# Patient Record
Sex: FEMALE | Race: WHITE | NOT HISPANIC OR LATINO
[De-identification: names, ages, dates, MRNs, and addresses within clinical notes are randomized per-mention and may not be internally consistent; named-entity substitution may affect disease eponyms.]

---

## 2015-07-13 RX ORDER — HYDRALAZINE HCL 50 MG
100 TABLET ORAL
Qty: 0 | Refills: 0 | COMMUNITY
Start: 2015-07-13

## 2017-02-01 ENCOUNTER — MEDICATION RENEWAL (OUTPATIENT)
Age: 82
End: 2017-02-01

## 2017-03-06 ENCOUNTER — APPOINTMENT (OUTPATIENT)
Dept: SURGICAL ONCOLOGY | Facility: CLINIC | Age: 82
End: 2017-03-06

## 2017-03-06 VITALS
RESPIRATION RATE: 15 BRPM | TEMPERATURE: 98.6 F | WEIGHT: 133 LBS | HEART RATE: 61 BPM | SYSTOLIC BLOOD PRESSURE: 162 MMHG | HEIGHT: 59 IN | BODY MASS INDEX: 26.81 KG/M2 | DIASTOLIC BLOOD PRESSURE: 68 MMHG

## 2017-03-06 DIAGNOSIS — Z85.3 ENCOUNTER FOR FOLLOW-UP EXAMINATION AFTER COMPLETED TREATMENT FOR MALIGNANT NEOPLASM: ICD-10-CM

## 2017-03-06 DIAGNOSIS — Z97.3 PRESENCE OF SPECTACLES AND CONTACT LENSES: ICD-10-CM

## 2017-03-06 DIAGNOSIS — Z08 ENCOUNTER FOR FOLLOW-UP EXAMINATION AFTER COMPLETED TREATMENT FOR MALIGNANT NEOPLASM: ICD-10-CM

## 2017-03-06 DIAGNOSIS — Z80.9 FAMILY HISTORY OF MALIGNANT NEOPLASM, UNSPECIFIED: ICD-10-CM

## 2017-03-06 RX ORDER — AFLIBERCEPT 40 MG/ML
2 INJECTION, SOLUTION INTRAVITREAL
Refills: 0 | Status: ACTIVE | COMMUNITY

## 2017-03-27 ENCOUNTER — INPATIENT (INPATIENT)
Facility: HOSPITAL | Age: 82
LOS: 1 days | Discharge: HOME | DRG: 684 | End: 2017-03-29
Attending: INTERNAL MEDICINE | Admitting: INTERNAL MEDICINE
Payer: COMMERCIAL

## 2017-03-27 VITALS
WEIGHT: 130.07 LBS | OXYGEN SATURATION: 98 % | HEIGHT: 59 IN | DIASTOLIC BLOOD PRESSURE: 80 MMHG | HEART RATE: 81 BPM | TEMPERATURE: 98 F | RESPIRATION RATE: 18 BRPM | SYSTOLIC BLOOD PRESSURE: 182 MMHG

## 2017-03-27 DIAGNOSIS — R55 SYNCOPE AND COLLAPSE: ICD-10-CM

## 2017-03-27 LAB
ALBUMIN SERPL ELPH-MCNC: 3.6 G/DL — SIGNIFICANT CHANGE UP (ref 3.3–5)
ALBUMIN SERPL ELPH-MCNC: 3.9 G/DL
ALP BLD-CCNC: 49 U/L
ALP SERPL-CCNC: 55 U/L — SIGNIFICANT CHANGE UP (ref 40–120)
ALT FLD-CCNC: 18 U/L — SIGNIFICANT CHANGE UP (ref 12–78)
ALT SERPL-CCNC: 11 U/L
ANION GAP SERPL CALC-SCNC: 11 MMOL/L — SIGNIFICANT CHANGE UP (ref 5–17)
APPEARANCE UR: CLEAR — SIGNIFICANT CHANGE UP
APTT BLD: 33.7 SEC — SIGNIFICANT CHANGE UP (ref 27.5–37.4)
AST SERPL-CCNC: 18 U/L — SIGNIFICANT CHANGE UP (ref 15–37)
AST SERPL-CCNC: 21 U/L
BASOPHILS # BLD AUTO: 0 K/UL — SIGNIFICANT CHANGE UP (ref 0–0.2)
BASOPHILS # BLD AUTO: 0.02 K/UL
BASOPHILS NFR BLD AUTO: 0.3 %
BASOPHILS NFR BLD AUTO: 0.6 % — SIGNIFICANT CHANGE UP (ref 0–2)
BILIRUB DIRECT SERPL-MCNC: 0.2 MG/DL
BILIRUB INDIRECT SERPL-MCNC: 0.3 MG/DL
BILIRUB SERPL-MCNC: 0.5 MG/DL
BILIRUB SERPL-MCNC: 0.5 MG/DL — SIGNIFICANT CHANGE UP (ref 0.2–1.2)
BILIRUB UR-MCNC: NEGATIVE — SIGNIFICANT CHANGE UP
BUN SERPL-MCNC: 22 MG/DL — SIGNIFICANT CHANGE UP (ref 7–23)
CALCIUM SERPL-MCNC: 9.4 MG/DL — SIGNIFICANT CHANGE UP (ref 8.5–10.1)
CANCER AG27-29 SERPL-ACNC: 18.6 U/ML
CEA SERPL-MCNC: 2.6 NG/ML
CHLORIDE SERPL-SCNC: 109 MMOL/L — HIGH (ref 96–108)
CK MB BLD-MCNC: 4 % — HIGH (ref 0–3.5)
CK MB CFR SERPL CALC: 2.1 NG/ML — SIGNIFICANT CHANGE UP (ref 0–3.6)
CK SERPL-CCNC: 53 U/L — SIGNIFICANT CHANGE UP (ref 26–192)
CO2 SERPL-SCNC: 23 MMOL/L — SIGNIFICANT CHANGE UP (ref 22–31)
COLOR SPEC: YELLOW — SIGNIFICANT CHANGE UP
CREAT SERPL-MCNC: 0.88 MG/DL — SIGNIFICANT CHANGE UP (ref 0.5–1.3)
DIFF PNL FLD: NEGATIVE — SIGNIFICANT CHANGE UP
EOSINOPHIL # BLD AUTO: 0.08 K/UL
EOSINOPHIL # BLD AUTO: 0.1 K/UL — SIGNIFICANT CHANGE UP (ref 0–0.5)
EOSINOPHIL NFR BLD AUTO: 1.1 %
EOSINOPHIL NFR BLD AUTO: 1.7 % — SIGNIFICANT CHANGE UP (ref 0–6)
GLUCOSE SERPL-MCNC: 112 MG/DL — HIGH (ref 70–99)
GLUCOSE UR QL: NEGATIVE — SIGNIFICANT CHANGE UP
HCT VFR BLD CALC: 34.8 %
HCT VFR BLD CALC: 36.8 % — SIGNIFICANT CHANGE UP (ref 34.5–45)
HGB BLD-MCNC: 10.7 G/DL
HGB BLD-MCNC: 11.7 G/DL — SIGNIFICANT CHANGE UP (ref 11.5–15.5)
IMM GRANULOCYTES NFR BLD AUTO: 0.1 %
INR BLD: 0.96 RATIO — SIGNIFICANT CHANGE UP (ref 0.88–1.16)
KETONES UR-MCNC: NEGATIVE — SIGNIFICANT CHANGE UP
LEUKOCYTE ESTERASE UR-ACNC: ABNORMAL
LYMPHOCYTES # BLD AUTO: 1.5 K/UL — SIGNIFICANT CHANGE UP (ref 1–3.3)
LYMPHOCYTES # BLD AUTO: 1.7 K/UL
LYMPHOCYTES # BLD AUTO: 23.6 % — SIGNIFICANT CHANGE UP (ref 13–44)
LYMPHOCYTES NFR BLD AUTO: 22.7 %
MAN DIFF?: NORMAL
MCHC RBC-ENTMCNC: 30.7 GM/DL
MCHC RBC-ENTMCNC: 31.2 PG
MCHC RBC-ENTMCNC: 31.6 PG — SIGNIFICANT CHANGE UP (ref 27–34)
MCHC RBC-ENTMCNC: 31.9 GM/DL — LOW (ref 32–36)
MCV RBC AUTO: 101.5 FL
MCV RBC AUTO: 99.2 FL — SIGNIFICANT CHANGE UP (ref 80–100)
MONOCYTES # BLD AUTO: 0.7 K/UL — SIGNIFICANT CHANGE UP (ref 0–0.9)
MONOCYTES # BLD AUTO: 0.83 K/UL
MONOCYTES NFR BLD AUTO: 11.1 %
MONOCYTES NFR BLD AUTO: 12 % — HIGH (ref 1–9)
NEUTROPHILS # BLD AUTO: 3.8 K/UL — SIGNIFICANT CHANGE UP (ref 1.8–7.4)
NEUTROPHILS # BLD AUTO: 4.84 K/UL
NEUTROPHILS NFR BLD AUTO: 62.2 % — SIGNIFICANT CHANGE UP (ref 43–77)
NEUTROPHILS NFR BLD AUTO: 64.7 %
NITRITE UR-MCNC: NEGATIVE — SIGNIFICANT CHANGE UP
PH UR: 5 — SIGNIFICANT CHANGE UP (ref 4.8–8)
PLATELET # BLD AUTO: 197 K/UL — SIGNIFICANT CHANGE UP (ref 150–400)
PLATELET # BLD AUTO: 228 K/UL
POTASSIUM SERPL-MCNC: 3.8 MMOL/L — SIGNIFICANT CHANGE UP (ref 3.5–5.3)
POTASSIUM SERPL-SCNC: 3.8 MMOL/L — SIGNIFICANT CHANGE UP (ref 3.5–5.3)
PROT SERPL-MCNC: 6.1 G/DL
PROT SERPL-MCNC: 6.8 G/DL — SIGNIFICANT CHANGE UP (ref 6–8.3)
PROT UR-MCNC: NEGATIVE — SIGNIFICANT CHANGE UP
PROTHROM AB SERPL-ACNC: 10.5 SEC — SIGNIFICANT CHANGE UP (ref 9.8–12.7)
RBC # BLD: 3.43 M/UL
RBC # BLD: 3.71 M/UL — LOW (ref 3.8–5.2)
RBC # FLD: 13.5 % — SIGNIFICANT CHANGE UP (ref 10.3–14.5)
RBC # FLD: 14.9 %
SODIUM SERPL-SCNC: 143 MMOL/L — SIGNIFICANT CHANGE UP (ref 135–145)
SP GR SPEC: 1 — LOW (ref 1.01–1.02)
TROPONIN I SERPL-MCNC: <.015 NG/ML — SIGNIFICANT CHANGE UP (ref 0.01–0.04)
UROBILINOGEN FLD QL: NEGATIVE — SIGNIFICANT CHANGE UP
WBC # BLD: 6.2 K/UL — SIGNIFICANT CHANGE UP (ref 3.8–10.5)
WBC # FLD AUTO: 6.2 K/UL — SIGNIFICANT CHANGE UP (ref 3.8–10.5)
WBC # FLD AUTO: 7.48 K/UL

## 2017-03-27 PROCEDURE — 71010: CPT | Mod: 26

## 2017-03-27 PROCEDURE — 70450 CT HEAD/BRAIN W/O DYE: CPT | Mod: 26

## 2017-03-27 PROCEDURE — 93010 ELECTROCARDIOGRAM REPORT: CPT

## 2017-03-27 PROCEDURE — 99285 EMERGENCY DEPT VISIT HI MDM: CPT

## 2017-03-27 RX ORDER — HYDRALAZINE HCL 50 MG
100 TABLET ORAL THREE TIMES A DAY
Qty: 0 | Refills: 0 | Status: DISCONTINUED | OUTPATIENT
Start: 2017-03-27 | End: 2017-03-28

## 2017-03-27 RX ORDER — ASPIRIN/CALCIUM CARB/MAGNESIUM 324 MG
81 TABLET ORAL DAILY
Qty: 0 | Refills: 0 | Status: DISCONTINUED | OUTPATIENT
Start: 2017-03-27 | End: 2017-03-29

## 2017-03-27 RX ORDER — ALLOPURINOL 300 MG
100 TABLET ORAL DAILY
Qty: 0 | Refills: 0 | Status: DISCONTINUED | OUTPATIENT
Start: 2017-03-27 | End: 2017-03-29

## 2017-03-27 RX ORDER — ACETAMINOPHEN 500 MG
650 TABLET ORAL EVERY 6 HOURS
Qty: 0 | Refills: 0 | Status: DISCONTINUED | OUTPATIENT
Start: 2017-03-27 | End: 2017-03-29

## 2017-03-27 RX ORDER — SODIUM CHLORIDE 9 MG/ML
1000 INJECTION INTRAMUSCULAR; INTRAVENOUS; SUBCUTANEOUS ONCE
Qty: 0 | Refills: 0 | Status: COMPLETED | OUTPATIENT
Start: 2017-03-27 | End: 2017-03-27

## 2017-03-27 RX ORDER — ENOXAPARIN SODIUM 100 MG/ML
40 INJECTION SUBCUTANEOUS DAILY
Qty: 0 | Refills: 0 | Status: DISCONTINUED | OUTPATIENT
Start: 2017-03-27 | End: 2017-03-29

## 2017-03-27 RX ORDER — ANASTROZOLE 1 MG/1
1 TABLET ORAL DAILY
Qty: 0 | Refills: 0 | Status: DISCONTINUED | OUTPATIENT
Start: 2017-03-27 | End: 2017-03-29

## 2017-03-27 RX ORDER — DOCUSATE SODIUM 100 MG
100 CAPSULE ORAL THREE TIMES A DAY
Qty: 0 | Refills: 0 | Status: DISCONTINUED | OUTPATIENT
Start: 2017-03-27 | End: 2017-03-29

## 2017-03-27 RX ORDER — CARVEDILOL PHOSPHATE 80 MG/1
3.12 CAPSULE, EXTENDED RELEASE ORAL EVERY 12 HOURS
Qty: 0 | Refills: 0 | Status: DISCONTINUED | OUTPATIENT
Start: 2017-03-27 | End: 2017-03-29

## 2017-03-27 RX ORDER — HYDRALAZINE HCL 50 MG
10 TABLET ORAL ONCE
Qty: 0 | Refills: 0 | Status: COMPLETED | OUTPATIENT
Start: 2017-03-27 | End: 2017-03-27

## 2017-03-27 RX ORDER — LEVOTHYROXINE SODIUM 125 MCG
88 TABLET ORAL DAILY
Qty: 0 | Refills: 0 | Status: DISCONTINUED | OUTPATIENT
Start: 2017-03-27 | End: 2017-03-29

## 2017-03-27 RX ORDER — CLOPIDOGREL BISULFATE 75 MG/1
75 TABLET, FILM COATED ORAL DAILY
Qty: 0 | Refills: 0 | Status: DISCONTINUED | OUTPATIENT
Start: 2017-03-27 | End: 2017-03-29

## 2017-03-27 RX ORDER — VALSARTAN 80 MG/1
320 TABLET ORAL DAILY
Qty: 0 | Refills: 0 | Status: DISCONTINUED | OUTPATIENT
Start: 2017-03-27 | End: 2017-03-29

## 2017-03-27 RX ORDER — LATANOPROST 0.05 MG/ML
1 SOLUTION/ DROPS OPHTHALMIC; TOPICAL AT BEDTIME
Qty: 0 | Refills: 0 | Status: DISCONTINUED | OUTPATIENT
Start: 2017-03-27 | End: 2017-03-29

## 2017-03-27 RX ADMIN — SODIUM CHLORIDE 1000 MILLILITER(S): 9 INJECTION INTRAMUSCULAR; INTRAVENOUS; SUBCUTANEOUS at 18:59

## 2017-03-27 RX ADMIN — Medication 10 MILLIGRAM(S): at 22:06

## 2017-03-27 NOTE — ED PROVIDER NOTE - CONSTITUTIONAL, MLM
normal... Well appearing, awake, alert, oriented to person, place, time/situation and in no apparent distress.

## 2017-03-27 NOTE — ED ADULT NURSE NOTE - PMH
Anemia    Aortal stenosis    Breast cancer    CAD (coronary artery disease)    CAD (coronary artery disease)    Chronic bronchitis  resolved currently  CKD (chronic kidney disease)    Depression    Gout    Hearing loss    HLD (hyperlipidemia)    HTN (hypertension)    Hypothyroidism    Macular degeneration    Spinal stenosis    Syncope  syncope episodes, last episode in 11/2015 per pt  TIA (transient ischemic attack)  1995

## 2017-03-27 NOTE — ED PROVIDER NOTE - PROGRESS NOTE DETAILS
spoke with Dr YOKO Chaudhary , case discussed, requested call him back with results spoke with Dr YOKO mahoney, case discussed, will admit patient, advised call Dr Jerry yen

## 2017-03-27 NOTE — ED PROVIDER NOTE - ATTENDING CONTRIBUTION TO CARE
I have personally performed a face to face diagnostic evaluation on this patient.  I have reviewed the PA note and agree with the history, exam, and plan of care, except as noted.  History and Exam by me shows 89 female presents to ER by ambulance from PMD office with report of near syncope. Patient states she was not feeling well, and felt like she was going to pass out, was held up by the nurse and had EMS called. Patient states her dizziness resolved but still feels generalized weakness, currently denies chest pain, alert and oriented, heart and lung sounds clear, abdomen soft, f/u ekg, labs, cardiac enzymes, ct head, call cardio.

## 2017-03-27 NOTE — H&P ADULT. - HISTORY OF PRESENT ILLNESS
89 F with PMH of CAD LBBB HTN hypothyroid TAVR (~ 1 year ago), stents x 3 to RCA (by Dr Ontiveros ~1 year ago) came with  near syncopal episode today , occurred at PMD Dr Anju Clements office, states she went from sitting to standing position, then episode occurred, no sob.  no fever, no cough, no nvd, had breakfast and lunch today. Has been feeling very tired, has many episodes of dizziness. Denies edema, orthopnea, PND, shortness of breath, dyspnea on exertion, palpitations, lightheadedness, claudication. Also had brief episodes of chest pain at night that woke her up from sleep lasting a few seconds.

## 2017-03-27 NOTE — ED ADULT NURSE NOTE - OBJECTIVE STATEMENT
pt sent from PMD office, states she passed out when she got up to leave, pt denies any new pain or concerns. she did not hit the floor, "they caught me"
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
## 2017-03-27 NOTE — ED PROVIDER NOTE - OBJECTIVE STATEMENT
near syncopal episode today , occurred at PMD Dr Anju Clements office, states she went from sitting to standing position, then episode occurred, denies chest pain, no sob.  no fever, no cough, no nvd, had breakfast and lunch today, states has urinary frequency,  at bedside.

## 2017-03-28 LAB
ANION GAP SERPL CALC-SCNC: 10 MMOL/L — SIGNIFICANT CHANGE UP (ref 5–17)
BUN SERPL-MCNC: 19 MG/DL — SIGNIFICANT CHANGE UP (ref 7–23)
CALCIUM SERPL-MCNC: 8.6 MG/DL — SIGNIFICANT CHANGE UP (ref 8.5–10.1)
CHLORIDE SERPL-SCNC: 112 MMOL/L — HIGH (ref 96–108)
CHOLEST SERPL-MCNC: 217 MG/DL — HIGH (ref 10–199)
CK SERPL-CCNC: 52 U/L — SIGNIFICANT CHANGE UP (ref 26–192)
CK SERPL-CCNC: 59 U/L — SIGNIFICANT CHANGE UP (ref 26–192)
CO2 SERPL-SCNC: 23 MMOL/L — SIGNIFICANT CHANGE UP (ref 22–31)
CREAT SERPL-MCNC: 0.8 MG/DL — SIGNIFICANT CHANGE UP (ref 0.5–1.3)
CULTURE RESULTS: SIGNIFICANT CHANGE UP
GLUCOSE SERPL-MCNC: 93 MG/DL — SIGNIFICANT CHANGE UP (ref 70–99)
HCT VFR BLD CALC: 32.8 % — LOW (ref 34.5–45)
HDLC SERPL-MCNC: 88 MG/DL — SIGNIFICANT CHANGE UP (ref 40–125)
HGB BLD-MCNC: 10.6 G/DL — LOW (ref 11.5–15.5)
LIPID PNL WITH DIRECT LDL SERPL: 110 MG/DL — SIGNIFICANT CHANGE UP
MAGNESIUM SERPL-MCNC: 2.1 MG/DL — SIGNIFICANT CHANGE UP (ref 1.8–2.4)
MCHC RBC-ENTMCNC: 32.4 GM/DL — SIGNIFICANT CHANGE UP (ref 32–36)
MCHC RBC-ENTMCNC: 32.5 PG — SIGNIFICANT CHANGE UP (ref 27–34)
MCV RBC AUTO: 100.2 FL — HIGH (ref 80–100)
PLATELET # BLD AUTO: 174 K/UL — SIGNIFICANT CHANGE UP (ref 150–400)
POTASSIUM SERPL-MCNC: 3.7 MMOL/L — SIGNIFICANT CHANGE UP (ref 3.5–5.3)
POTASSIUM SERPL-SCNC: 3.7 MMOL/L — SIGNIFICANT CHANGE UP (ref 3.5–5.3)
RBC # BLD: 3.27 M/UL — LOW (ref 3.8–5.2)
RBC # FLD: 13.5 % — SIGNIFICANT CHANGE UP (ref 10.3–14.5)
SODIUM SERPL-SCNC: 145 MMOL/L — SIGNIFICANT CHANGE UP (ref 135–145)
SPECIMEN SOURCE: SIGNIFICANT CHANGE UP
TOTAL CHOLESTEROL/HDL RATIO MEASUREMENT: 2.5 RATIO — LOW (ref 3.3–7.1)
TRIGL SERPL-MCNC: 97 MG/DL — SIGNIFICANT CHANGE UP (ref 10–149)
WBC # BLD: 5.6 K/UL — SIGNIFICANT CHANGE UP (ref 3.8–10.5)
WBC # FLD AUTO: 5.6 K/UL — SIGNIFICANT CHANGE UP (ref 3.8–10.5)

## 2017-03-28 PROCEDURE — 93880 EXTRACRANIAL BILAT STUDY: CPT | Mod: 26

## 2017-03-28 RX ORDER — HYDRALAZINE HCL 50 MG
100 TABLET ORAL
Qty: 0 | Refills: 0 | Status: DISCONTINUED | OUTPATIENT
Start: 2017-03-28 | End: 2017-03-29

## 2017-03-28 RX ORDER — BRIMONIDINE TARTRATE, TIMOLOL MALEATE 2; 5 MG/ML; MG/ML
1 SOLUTION/ DROPS OPHTHALMIC
Qty: 0 | Refills: 0 | Status: DISCONTINUED | OUTPATIENT
Start: 2017-03-28 | End: 2017-03-29

## 2017-03-28 RX ORDER — LABETALOL HCL 100 MG
10 TABLET ORAL ONCE
Qty: 0 | Refills: 0 | Status: COMPLETED | OUTPATIENT
Start: 2017-03-28 | End: 2017-03-28

## 2017-03-28 RX ORDER — HYDRALAZINE HCL 50 MG
10 TABLET ORAL ONCE
Qty: 0 | Refills: 0 | Status: COMPLETED | OUTPATIENT
Start: 2017-03-28 | End: 2017-03-28

## 2017-03-28 RX ADMIN — Medication 100 MILLIGRAM(S): at 18:48

## 2017-03-28 RX ADMIN — LATANOPROST 1 DROP(S): 0.05 SOLUTION/ DROPS OPHTHALMIC; TOPICAL at 21:09

## 2017-03-28 RX ADMIN — Medication 100 MILLIGRAM(S): at 13:30

## 2017-03-28 RX ADMIN — Medication 100 MILLIGRAM(S): at 05:46

## 2017-03-28 RX ADMIN — Medication 88 MICROGRAM(S): at 05:46

## 2017-03-28 RX ADMIN — CARVEDILOL PHOSPHATE 3.12 MILLIGRAM(S): 80 CAPSULE, EXTENDED RELEASE ORAL at 18:48

## 2017-03-28 RX ADMIN — Medication 100 MILLIGRAM(S): at 21:09

## 2017-03-28 RX ADMIN — Medication 100 MILLIGRAM(S): at 14:50

## 2017-03-28 RX ADMIN — ENOXAPARIN SODIUM 40 MILLIGRAM(S): 100 INJECTION SUBCUTANEOUS at 11:49

## 2017-03-28 RX ADMIN — BRIMONIDINE TARTRATE, TIMOLOL MALEATE 1 DROP(S): 2; 5 SOLUTION/ DROPS OPHTHALMIC at 21:09

## 2017-03-28 RX ADMIN — Medication 100 MILLIGRAM(S): at 11:49

## 2017-03-28 RX ADMIN — CARVEDILOL PHOSPHATE 3.12 MILLIGRAM(S): 80 CAPSULE, EXTENDED RELEASE ORAL at 05:46

## 2017-03-28 RX ADMIN — Medication 81 MILLIGRAM(S): at 11:48

## 2017-03-28 RX ADMIN — ANASTROZOLE 1 MILLIGRAM(S): 1 TABLET ORAL at 11:49

## 2017-03-28 RX ADMIN — VALSARTAN 320 MILLIGRAM(S): 80 TABLET ORAL at 05:46

## 2017-03-28 RX ADMIN — CLOPIDOGREL BISULFATE 75 MILLIGRAM(S): 75 TABLET, FILM COATED ORAL at 11:49

## 2017-03-28 RX ADMIN — Medication 10 MILLIGRAM(S): at 01:30

## 2017-03-28 NOTE — PHYSICAL THERAPY INITIAL EVALUATION ADULT - PERTINENT HX OF CURRENT PROBLEM, REHAB EVAL
89 F with PMH of CAD LBBB HTN hypothyroid TAVR (~ 1 year ago), stents x 3 to RCA . came with  near syncopal episode today

## 2017-03-29 ENCOUNTER — TRANSCRIPTION ENCOUNTER (OUTPATIENT)
Age: 82
End: 2017-03-29

## 2017-03-29 VITALS
DIASTOLIC BLOOD PRESSURE: 68 MMHG | SYSTOLIC BLOOD PRESSURE: 166 MMHG | OXYGEN SATURATION: 96 % | HEART RATE: 68 BPM | RESPIRATION RATE: 23 BRPM | TEMPERATURE: 98 F

## 2017-03-29 DIAGNOSIS — I10 ESSENTIAL (PRIMARY) HYPERTENSION: ICD-10-CM

## 2017-03-29 PROCEDURE — 80061 LIPID PANEL: CPT

## 2017-03-29 PROCEDURE — 93005 ELECTROCARDIOGRAM TRACING: CPT

## 2017-03-29 PROCEDURE — 96372 THER/PROPH/DIAG INJ SC/IM: CPT | Mod: 59

## 2017-03-29 PROCEDURE — 97161 PT EVAL LOW COMPLEX 20 MIN: CPT

## 2017-03-29 PROCEDURE — 85027 COMPLETE CBC AUTOMATED: CPT

## 2017-03-29 PROCEDURE — 93880 EXTRACRANIAL BILAT STUDY: CPT

## 2017-03-29 PROCEDURE — 80053 COMPREHEN METABOLIC PANEL: CPT

## 2017-03-29 PROCEDURE — 71045 X-RAY EXAM CHEST 1 VIEW: CPT

## 2017-03-29 PROCEDURE — 84484 ASSAY OF TROPONIN QUANT: CPT

## 2017-03-29 PROCEDURE — 80048 BASIC METABOLIC PNL TOTAL CA: CPT

## 2017-03-29 PROCEDURE — 99285 EMERGENCY DEPT VISIT HI MDM: CPT | Mod: 25

## 2017-03-29 PROCEDURE — 93306 TTE W/DOPPLER COMPLETE: CPT

## 2017-03-29 PROCEDURE — 82553 CREATINE MB FRACTION: CPT

## 2017-03-29 PROCEDURE — 82550 ASSAY OF CK (CPK): CPT

## 2017-03-29 PROCEDURE — 83735 ASSAY OF MAGNESIUM: CPT

## 2017-03-29 PROCEDURE — 70450 CT HEAD/BRAIN W/O DYE: CPT

## 2017-03-29 PROCEDURE — 87086 URINE CULTURE/COLONY COUNT: CPT

## 2017-03-29 PROCEDURE — 81001 URINALYSIS AUTO W/SCOPE: CPT

## 2017-03-29 PROCEDURE — 85610 PROTHROMBIN TIME: CPT

## 2017-03-29 PROCEDURE — 85730 THROMBOPLASTIN TIME PARTIAL: CPT

## 2017-03-29 PROCEDURE — 96374 THER/PROPH/DIAG INJ IV PUSH: CPT

## 2017-03-29 PROCEDURE — 96375 TX/PRO/DX INJ NEW DRUG ADDON: CPT

## 2017-03-29 RX ORDER — ERYTHROMYCIN BASE 5 MG/GRAM
1 OINTMENT (GRAM) OPHTHALMIC (EYE)
Qty: 1 | Refills: 1 | OUTPATIENT
Start: 2017-03-29 | End: 2017-04-17

## 2017-03-29 RX ADMIN — CLOPIDOGREL BISULFATE 75 MILLIGRAM(S): 75 TABLET, FILM COATED ORAL at 12:01

## 2017-03-29 RX ADMIN — Medication 100 MILLIGRAM(S): at 13:10

## 2017-03-29 RX ADMIN — BRIMONIDINE TARTRATE, TIMOLOL MALEATE 1 DROP(S): 2; 5 SOLUTION/ DROPS OPHTHALMIC at 05:50

## 2017-03-29 RX ADMIN — Medication 100 MILLIGRAM(S): at 12:01

## 2017-03-29 RX ADMIN — Medication 100 MILLIGRAM(S): at 05:51

## 2017-03-29 RX ADMIN — Medication 81 MILLIGRAM(S): at 12:01

## 2017-03-29 RX ADMIN — ANASTROZOLE 1 MILLIGRAM(S): 1 TABLET ORAL at 12:03

## 2017-03-29 RX ADMIN — CARVEDILOL PHOSPHATE 3.12 MILLIGRAM(S): 80 CAPSULE, EXTENDED RELEASE ORAL at 05:51

## 2017-03-29 RX ADMIN — ENOXAPARIN SODIUM 40 MILLIGRAM(S): 100 INJECTION SUBCUTANEOUS at 12:01

## 2017-03-29 RX ADMIN — Medication 88 MICROGRAM(S): at 05:51

## 2017-03-29 RX ADMIN — VALSARTAN 320 MILLIGRAM(S): 80 TABLET ORAL at 05:51

## 2017-03-29 NOTE — DISCHARGE NOTE ADULT - CARE PLAN
Principal Discharge DX:	Near syncope  Goal:	.  Instructions for follow-up, activity and diet:	Follow-up with Dr. Thompson this week.

## 2017-03-29 NOTE — DISCHARGE NOTE ADULT - PATIENT PORTAL LINK FT
“You can access the [facility] Patient Portal, offered by [facility], by registering with the following website: [email or web address]”

## 2017-03-29 NOTE — PROGRESS NOTE ADULT - SUBJECTIVE AND OBJECTIVE BOX
Neurology Follow up note    PRANAY CHAMPION89yFemale    HPI:  89 F with PMH of CAD LBBB HTN hypothyroid TAVR (~ 1 year ago), stents x 3 to RCA (by Dr Ontiveros ~1 year ago) came with  near syncopal episode today , occurred at PMD Dr Anju Clements office, states she went from sitting to standing position, then episode occurred, no sob.  no fever, no cough, no nvd, had breakfast and lunch today. Has been feeling very tired, has many episodes of dizziness. Denies edema, orthopnea, PND, shortness of breath, dyspnea on exertion, palpitations, lightheadedness, claudication. Also had brief episodes of chest pain at night that woke her up from sleep lasting a few seconds. (27 Mar 2017 23:21)      Interval History -    Patient is seen, chart was reviewed and case was discussed with the treatment team.  Pt is not in any distress.   Lying on bed comfortably.   No events reported overnight.   No clinical seizure was reported.  Sitting on chair bed comfortably.    is at bedside.    Vital Signs Last 24 Hrs  T(C): 36.4, Max: 37.2 (- @ 15:40)  T(F): 97.6, Max: 98.9 (03- @ 15:40)  HR: 65 (64 - 85)  BP: 158/70 (146/64 - 210/80)  BP(mean): --  RR: 19 (18 - 19)  SpO2: 93% (93% - 97%)        REVIEW OF SYSTEMS:    Constitutional: No fever, weight loss or fatigue  Eyes: No eye pain, visual disturbances, or discharge  ENT:  No difficulty hearing, tinnitus, vertigo; No sinus or throat pain  Neck: No pain or stiffness  Respiratory: No cough, wheezing, chills or hemoptysis  Cardiovascular: No chest pain, palpitations, shortness of breath, dizziness or leg swelling  Gastrointestinal: No abdominal or epigastric pain. No nausea, vomiting or hematemesis; No diarrhea or constipation. No melena or hematochezia.  Genitourinary: No dysuria, frequency, hematuria or incontinence  Neurological: No headaches, memory loss, loss of strength, numbness or tremors  Psychiatric: No depression, anxiety, mood swings or difficulty sleeping  Musculoskeletal: No joint pain or swelling; No muscle, back or extremity pain  Skin: No itching, burning, rashes or lesions   Lymph Nodes: No enlarged glands  Endocrine: No heat or cold intolerance; No hair loss, No h/o diabetes or thyroid dysfunction  Allergy and Immunologic: No hives or eczema    On Neurological Examination:    Mental Status - Pt is alert, awake, oriented X3. Higher functions are intact. Follows commands well and able to answer questions appropriately.Mood and affect  normal    Speech -  Normal. Slurred. Pt has no aphasia.    Cranial Nerves - Pupils 3 mm equal and reactive to light, extraocular eye movements intact. Pt has no visual field deficit.  Pt has no right left facial asymmetry. Facial sensation is intact.Tongue - is in midline.    Motor Exam - 5/5 in UE. 4/5 LE., No drift. No shaking or tremors.  Muscle tone - is normal all over. Moves all extremities equally. No asymmetry is seen.      Sensory Exam - Pin prick, temperature, joint position and vibration are intact on either side. Pt withdraws all extremities equally on stimulation. No asymmetry seen. No complaints of tingling, numbness.    Gait - Able to stand and walk walker. Pt is able to stand up with holding my hands and is able to walk for few feet around the bed. Not falling to either side.    Deep tendon Reflexes - 2 plus all over.    Coordination - Fine finger movements are normal on both sides. Finger to nose is also normal on both sides.       Romberg - Negative.    Neck Supple -  Yes.     MEDICATIONS    aspirin enteric coated 81milliGRAM(s) Oral daily  valsartan 320milliGRAM(s) Oral daily  allopurinol 100milliGRAM(s) Oral daily  anastrozole 1milliGRAM(s) Oral daily  clopidogrel Tablet 75milliGRAM(s) Oral daily  carvedilol 3.125milliGRAM(s) Oral every 12 hours  docusate sodium 100milliGRAM(s) Oral three times a day  latanoprost 0.005% Ophthalmic Solution 1Drop(s) Both EYES at bedtime  levothyroxine 88MICROGram(s) Oral daily  enoxaparin Injectable 40milliGRAM(s) SubCutaneous daily  acetaminophen   Tablet 650milliGRAM(s) Oral every 6 hours PRN  acetaminophen   Tablet. 650milliGRAM(s) Oral every 6 hours PRN  oxyCODONE  5 mG/acetaminophen 325 mG 1Tablet(s) Oral every 6 hours PRN  brimonidine 0.2%/ timolol 0.5% Ophthalmic Solution 1Drop(s) Both EYES two times a day  hydrALAZINE 100milliGRAM(s) Oral four times a day      Allergies    No Known Allergies    Intolerances    Lipitor (Other)      LABS:  CBC Full  -  ( 28 Mar 2017 06:48 )  WBC Count : 5.6 K/uL  Hemoglobin : 10.6 g/dL  Hematocrit : 32.8 %  Platelet Count - Automated : 174 K/uL  Mean Cell Volume : 100.2 fl  Mean Cell Hemoglobin : 32.5 pg  Mean Cell Hemoglobin Concentration : 32.4 gm/dL  Auto Neutrophil # : x  Auto Lymphocyte # : x  Auto Monocyte # : x  Auto Eosinophil # : x  Auto Basophil # : x  Auto Neutrophil % : x  Auto Lymphocyte % : x  Auto Monocyte % : x  Auto Eosinophil % : x  Auto Basophil % : x    Urinalysis Basic - ( 27 Mar 2017 19:13 )    Color: Yellow / Appearance: Clear / S.005 / pH: x  Gluc: x / Ketone: Negative  / Bili: Negative / Urobili: Negative   Blood: x / Protein: Negative / Nitrite: Negative   Leuk Esterase: Trace / RBC: 0-2 /HPF / WBC 3-5   Sq Epi: x / Non Sq Epi: Occasional / Bacteria: Occasional      28 Mar 2017 06:48    145    |  112    |  19     ----------------------------<  93     3.7     |  23     |  0.80     Ca    8.6        28 Mar 2017 06:48  Mg     2.1       28 Mar 2017 06:48    TPro  6.8    /  Alb  3.6    /  TBili  0.5    /  DBili  x      /  AST  18     /  ALT  18     /  AlkPhos  55     27 Mar 2017 18:27    Hemoglobin A1C:     Vitamin B12     RADIOLOGYIMPRESSION:  All peak systolic and end diastolic velocities are within   normal limits. No sonographic evidence of hemodynamically significant   stenosis.    IMPRESSION:  No hemorrhage or mass effect. Moderate nonspecific white matter disease   with asymmetric involvement of the right frontal operculum, which may   represent an age-indeterminate white matter infarct.    If there are new or persistent symptoms, consider further evaluation with   brain MRI if clinically warranted and if there are no contraindications.  ASSESSMENT AND PLAN:    Near Syncope.likely due HTN      Physical therapy evaluation.  OOB to chair/ambulation with assistance only.  Advanced care planning was discussed with family.  Pain is accessed and addressed.  Pt was screened for signs of clinical depression. Appropriate referral made.  Risk of falls accessed. Fall prevention and plan of care was discussed with family.  Pt is screened for tobacco and alcohol use. Counseling was done for smoking and alcohol cessation.  Use of narcotic pain meds was dicussed. Pt is advised to use narcotic meds wisely and to refrain from over using them.  Plan of care was discussed with family. Questions answered.  Would continue to follow.      NEURO WISE STABLE FOR DC.  STIVEN PATIENT AND DR SMITH.
Patient is a 89y old  Female who presents with a chief complaint of near syncope (29 Mar 2017 14:02)      INTERVAL HPI/OVERNIGHT EVENTS: Patient seen and examined. NAD. Feels well.     Vital Signs Last 24 Hrs  T(C): 36.9, Max: 37.2 ( @ 15:40)  T(F): 98.4, Max: 98.9 ( @ 15:40)  HR: 68 (64 - 85)  BP: 166/68 (146/64 - 202/83)  BP(mean): --  RR: 23 (18 - 23)  SpO2: 96% (93% - 97%)I&O's Summary    I & Os for current day (as of 29 Mar 2017 14:28)  =============================================  IN: 360 ml / OUT: 0 ml / NET: 360 ml      LABS:                        10.6   5.6   )-----------( 174      ( 28 Mar 2017 06:48 )             32.8     28 Mar 2017 06:48    145    |  112    |  19     ----------------------------<  93     3.7     |  23     |  0.80     Ca    8.6        28 Mar 2017 06:48  Mg     2.1       28 Mar 2017 06:48    TPro  6.8    /  Alb  3.6    /  TBili  0.5    /  DBili  x      /  AST  18     /  ALT  18     /  AlkPhos  55     27 Mar 2017 18:27    PT/INR - ( 27 Mar 2017 18:27 )   PT: 10.5 sec;   INR: 0.96 ratio         PTT - ( 27 Mar 2017 18:27 )  PTT:33.7 sec  Urinalysis Basic - ( 27 Mar 2017 19:13 )    Color: Yellow / Appearance: Clear / S.005 / pH: x  Gluc: x / Ketone: Negative  / Bili: Negative / Urobili: Negative   Blood: x / Protein: Negative / Nitrite: Negative   Leuk Esterase: Trace / RBC: 0-2 /HPF / WBC 3-5   Sq Epi: x / Non Sq Epi: Occasional / Bacteria: Occasional          REEXAM:  US DPLX CAROTIDS COMPL BI                            PROCEDURE DATE:  2017        INTERPRETATION:  Carotid Duplex Doppler Ultrasound    HISTORY: Near syncope    TECHNIQUE: Multiple grayscale and color Doppler ultrasound images of the   carotid and vertebral arteries were obtained.    Interpretation: Plaque formation is seen at both carotid bulbs, and   extending into both internal carotid arteries and both external carotid   arteries.    Blood flow velocities (cm/sec): (Normal is less than 125)  Right: Proximal CCA=91  Distal CCA=97  Prox ICA=72 Distal ICA=92      BRJ=676  ICA/CCA ratio=1.1 (Normal= less than 2)    Left: Proximal CCA=87    Distal CCA=65  Prox ICA=69  Distal ICA=90        ECA=61  ICA/CCA ratio=1 (Normal= less than 2)    The right vertebral artery shows antegrade flow. The left vertebral   artery shows antegrade flow.      IMPRESSION:  All peak systolic and end diastolic velocities are within   normal limits. No sonographic evidence of hemodynamically significant   stenosis.    Thank you for the courtesy of this referral.  VIEW OF SYSTEMS:  CONSTITUTIONAL: No fever, weight loss, or fatigue  NECK: No pain or stiffness  RESPIRATORY: No cough, wheezing, chills or hemoptysis; No shortness of breath  CARDIOVASCULAR: No chest pain, palpitations, dizziness, or leg swelling  GASTROINTESTINAL: No abdominal or epigastric pain. No nausea, vomiting, or hematemesis; No diarrhea or constipation. No melena or hematochezia.  GENITOURINARY: No dysuria, frequency, hematuria, or incontinence  NEUROLOGICAL: No headaches, memory loss, loss of strength, numbness, or tremors  SKIN: No itching, burning, rashes, or lesions   MUSCULOSKELETAL: No joint pain or swelling; No muscle, back, or extremity pain  PSYCHIATRIC: No depression, anxiety, mood swings, or difficulty sleeping  HEME/LYMPH: No easy bruising, or bleeding gums  ALLERY AND IMMUNOLOGIC: No hives     PHYSICAL EXAM:  GENERAL: NAD, well-groomed, well-developed  HEAD:  Atraumatic, Normocephalic  EYES: EOMI, PERRLA, conjunctiva and sclera clear  ENMT: No tonsillar erythema, exudates, or enlargement; Moist mucous membranes  NECK: Supple, No JVD  NERVOUS SYSTEM:  Alert & Oriented X3, Good concentration; Motor Strength 5/5 B/L upper and lower extremities; DTRs 2+ intact and symmetric  CHEST/LUNG: Clear to percussion bilaterally; No rales, rhonchi, wheezing, or rubs  HEART: Regular rate and rhythm; No murmurs, rubs, or gallops  ABDOMEN: Soft, Nontender, Nondistended; Bowel sounds present  EXTREMITIES:  2+ Peripheral Pulses, No clubbing, cyanosis, or edema  LYMPH: No lymphadenopathy noted  SKIN: No rashes or lesions    Care Discussed with Consultants/Other Providers [ x] YES  [ ] NO

## 2017-03-29 NOTE — DISCHARGE NOTE ADULT - MEDICATION SUMMARY - MEDICATIONS TO TAKE
I will START or STAY ON the medications listed below when I get home from the hospital:    acetaminophen-oxyCODONE 325 mg-5 mg oral tablet  -- 2 tab(s) by mouth every 4 hours, As needed, Severe Pain  -- Indication: For Pain    acetaminophen-oxyCODONE 325 mg-5 mg oral tablet  -- 1 tab(s) by mouth every 4 hours, As needed, Mild Pain  -- Indication: For Pain    aspirin 81 mg oral delayed release tablet  -- 1 tab(s) by mouth once a day  -- Indication: For Supplement    Diovan 320 mg oral tablet  -- 1 tab(s) by mouth once a day  -- Indication: For High BP    allopurinol 100 mg oral tablet  -- 1 tab(s) by mouth once a day  -- Indication: For gout    pravastatin 40 mg oral tablet  -- 1 tab(s) by mouth once a day (at bedtime)  -- Indication: For cholesterol    Zetia 10 mg oral tablet  -- 1 tab(s) by mouth once a day  -- Indication: For cholesterol    anastrozole 1 mg oral tablet  -- 1 tab(s) by mouth once a day  -- Indication: For breast cancer    clopidogrel 75 mg oral tablet  -- 1 tab(s) by mouth once a day  -- Indication: For Stents    Coreg 3.125 mg oral tablet  -- 1 tab(s) by mouth 2 times a day  -- Indication: For high bp    Colace 100 mg oral capsule  -- 1 cap(s) by mouth 3 times a day  -- Indication: For constipation    epinastine 0.05% ophthalmic solution  -- 1 drop(s) to each affected eye 2 times a day  -- Indication: For eye drops    Xalatan 0.005% ophthalmic solution  -- 1 drop(s) to each affected eye once a day (at bedtime)  -- Indication: For eye drops    erythromycin ophthalmic 0.5% ophthalmic ointment  -- 1 application to each affected eye 4 times a day  -- For the eye.    -- Indication: For eye infection    Synthroid 88 mcg (0.088 mg) oral tablet  -- 1 tab(s) by mouth once a day  -- Indication: For hypothyroid    hydrALAZINE 100 mg oral tablet  -- 100 milligram(s) by mouth 4 times a day  -- Indication: For high bp    Calcium 500+D oral tablet, chewable  -- 1 tab(s) by mouth 2 times a day  -- Indication: For Supplement

## 2017-03-31 DIAGNOSIS — E03.9 HYPOTHYROIDISM, UNSPECIFIED: ICD-10-CM

## 2017-03-31 DIAGNOSIS — N18.9 CHRONIC KIDNEY DISEASE, UNSPECIFIED: ICD-10-CM

## 2017-03-31 DIAGNOSIS — I12.9 HYPERTENSIVE CHRONIC KIDNEY DISEASE WITH STAGE 1 THROUGH STAGE 4 CHRONIC KIDNEY DISEASE, OR UNSPECIFIED CHRONIC KIDNEY DISEASE: ICD-10-CM

## 2017-03-31 DIAGNOSIS — I77.1 STRICTURE OF ARTERY: ICD-10-CM

## 2017-03-31 DIAGNOSIS — M10.9 GOUT, UNSPECIFIED: ICD-10-CM

## 2017-03-31 DIAGNOSIS — E78.5 HYPERLIPIDEMIA, UNSPECIFIED: ICD-10-CM

## 2017-03-31 DIAGNOSIS — D64.9 ANEMIA, UNSPECIFIED: ICD-10-CM

## 2017-03-31 DIAGNOSIS — H35.30 UNSPECIFIED MACULAR DEGENERATION: ICD-10-CM

## 2017-03-31 DIAGNOSIS — R55 SYNCOPE AND COLLAPSE: ICD-10-CM

## 2017-03-31 DIAGNOSIS — M48.00 SPINAL STENOSIS, SITE UNSPECIFIED: ICD-10-CM

## 2017-03-31 DIAGNOSIS — Z85.3 PERSONAL HISTORY OF MALIGNANT NEOPLASM OF BREAST: ICD-10-CM

## 2017-03-31 DIAGNOSIS — Z86.73 PERSONAL HISTORY OF TRANSIENT ISCHEMIC ATTACK (TIA), AND CEREBRAL INFARCTION WITHOUT RESIDUAL DEFICITS: ICD-10-CM

## 2017-03-31 DIAGNOSIS — H91.90 UNSPECIFIED HEARING LOSS, UNSPECIFIED EAR: ICD-10-CM

## 2017-03-31 DIAGNOSIS — F32.9 MAJOR DEPRESSIVE DISORDER, SINGLE EPISODE, UNSPECIFIED: ICD-10-CM

## 2017-04-03 ENCOUNTER — EMERGENCY (EMERGENCY)
Facility: HOSPITAL | Age: 82
LOS: 1 days | Discharge: HOME | End: 2017-04-03
Attending: EMERGENCY MEDICINE | Admitting: EMERGENCY MEDICINE
Payer: COMMERCIAL

## 2017-04-03 VITALS
WEIGHT: 130.07 LBS | RESPIRATION RATE: 14 BRPM | TEMPERATURE: 98 F | HEART RATE: 71 BPM | OXYGEN SATURATION: 99 % | SYSTOLIC BLOOD PRESSURE: 180 MMHG | DIASTOLIC BLOOD PRESSURE: 83 MMHG | HEIGHT: 59 IN

## 2017-04-03 VITALS
RESPIRATION RATE: 13 BRPM | DIASTOLIC BLOOD PRESSURE: 75 MMHG | SYSTOLIC BLOOD PRESSURE: 170 MMHG | OXYGEN SATURATION: 99 % | HEART RATE: 68 BPM

## 2017-04-03 DIAGNOSIS — E03.9 HYPOTHYROIDISM, UNSPECIFIED: ICD-10-CM

## 2017-04-03 DIAGNOSIS — N18.9 CHRONIC KIDNEY DISEASE, UNSPECIFIED: ICD-10-CM

## 2017-04-03 DIAGNOSIS — R04.0 EPISTAXIS: ICD-10-CM

## 2017-04-03 DIAGNOSIS — Z90.11 ACQUIRED ABSENCE OF RIGHT BREAST AND NIPPLE: ICD-10-CM

## 2017-04-03 DIAGNOSIS — I25.10 ATHEROSCLEROTIC HEART DISEASE OF NATIVE CORONARY ARTERY WITHOUT ANGINA PECTORIS: ICD-10-CM

## 2017-04-03 DIAGNOSIS — I12.9 HYPERTENSIVE CHRONIC KIDNEY DISEASE WITH STAGE 1 THROUGH STAGE 4 CHRONIC KIDNEY DISEASE, OR UNSPECIFIED CHRONIC KIDNEY DISEASE: ICD-10-CM

## 2017-04-03 DIAGNOSIS — E78.5 HYPERLIPIDEMIA, UNSPECIFIED: ICD-10-CM

## 2017-04-03 DIAGNOSIS — Z85.3 PERSONAL HISTORY OF MALIGNANT NEOPLASM OF BREAST: ICD-10-CM

## 2017-04-03 PROCEDURE — 99283 EMERGENCY DEPT VISIT LOW MDM: CPT | Mod: 25

## 2017-04-03 PROCEDURE — 30903 CONTROL OF NOSEBLEED: CPT | Mod: RT

## 2017-04-03 NOTE — ED PROVIDER NOTE - OBJECTIVE STATEMENT
89 y/o female presents to ED c/o nose bleeding from right nostril x 30 minutes. States she take daily baby aspirin and plavix but no other blood thinners. Denies any other complaints. States she otherwise feels good. Denies n/v, f/c, chest pain, sob, numbness, tingling, headache, lightheadedness, dizziness, visual changes.

## 2017-04-03 NOTE — ED ADULT NURSE NOTE - FAMILY HISTORY
Sibling  Still living? No  Family history of heart attack, Age at diagnosis: Age Unknown  Family history of DVT, Age at diagnosis: Age Unknown

## 2017-04-03 NOTE — ED PROVIDER NOTE - MEDICAL DECISION MAKING DETAILS
nasal packing in ED with 5.5 rapid rhino, bleeding controlled in ED. pt tolerated procedure well.   Please follow up with ENT Dr Wilkerson within 24 hours. Please follow up with your primary care provider within 2 days. Keep nasal packing in place. Augmentin twice a day x 5 days. Return to ED immediately if condition worsens or any concerns.  Seek immediate medical care for any new/worsening signs or symptoms.

## 2017-04-03 NOTE — ED PROVIDER NOTE - CHPI ED SYMPTOMS NEG
no chills/no loss of consciousness/no vomiting/no blurred vision/no nausea/no change in level of consciousness/no weakness/no numbness/no syncope/no fever

## 2017-04-03 NOTE — ED PROVIDER NOTE - ATTENDING CONTRIBUTION TO CARE
89 yo F with no anticoag pw epistaxis today. Onsey at home at rest. NO trauma / fall. Min blood loss. No weak /dizzy / malaise. Epistaxis stopped pta from EMS. No HA/dizzy. Pt with recent admission for HTN, now controlled. Slight HTN now but  otherwise pt is aymptomatic.  Exam with R nose packed (Rapid rhino by PA). nl l nare. No edema. Non-tender. No signs of trauma. Nl rest of exam.  Pt to fu with ENT tomorrow for prompt fu, will take abx while packing in place. Dw pt re HTn prec and inst, Epistaxis prec / inst and importance of fu with PMD and ENT.

## 2017-04-03 NOTE — ED PROVIDER NOTE - PHYSICAL EXAMINATION
HEENT- airway patent, uvula midline. bleeding controlled in ED, no active bleeding after nasal packing placed.

## 2018-04-24 NOTE — DISCHARGE NOTE ADULT - NS MD DC PLAN IMMU FLU PROVIDE INFO
skin w/d/i h/o left calf dvt 10 yrs ago s/p anti coag tx  x  2mos at that time pt denies clotting abnormalities or family  h/o dvt Risks/benefits discussed with patient or patient surrogate

## 2018-06-06 NOTE — ED ADULT NURSE NOTE - NS ED NURSE RECORD ANOTHER HT AND WT
AMB US Pelvic Non OB  Date/Time: 6/6/2018 2:01 PM  Performed by: Mark Patterson by: Danielle Bejarano     Procedure details:     Technique:  Transvaginal US, Non-OB    Position: lithotomy exam    Uterine findings:     Diameter (mm):  10    Length (mm):  145    Width (mm):  78    Myomas: identified      Endometrial stripe: identified      Endometrium thickness (mm):  4 5  Left ovary findings:     Left ovary:  Visualized    Diameter (mm):  13 4    Length (mm):  29    Width (mm):  19 5  Right ovary findings:     Right ovary:  Visualized    Diameter (mm):  13 7    Length (mm):  31 8    Width (mm):  19 3  Other findings:     Free pelvic fluid: not identified      Free peritoneal fluid: not identified    Post-Procedure Details:     Impression:  Enlarged anteverted uterus demonstrates a posterior fundal fibroid 8 2cm, smaller than previous ultrasound performed in office in 2012 however larger than MRI performed in 2015 (MRI fibroid size 6 4cm)  Bilateral ovaries appear within normal limits  No free fluid  Tolerance: Tolerated well, no immediate complications    Complications: no complications    Additional Procedure Comments:      Exam was performed both transabdominally and transvaginally to better assess overall uterine and fibroid size  [employer] Logiq P5 transvaginal transducer E8C with Serial Number [id number] was used during procedure and subsequently cleaned with high level disinfection utilizing the [employer]  Yes

## 2019-05-05 NOTE — DISCHARGE NOTE ADULT - VISION (WITH CORRECTIVE LENSES IF THE PATIENT USUALLY WEARS THEM):
Partially impaired: cannot see medication labels or newsprint, but can see obstacles in path, and the surrounding layout; can count fingers at arm's length normal (ped)...

## 2020-06-08 NOTE — ED ADULT NURSE NOTE - NS ED NOTE  TALK SOMEONE YN
Barb Duckworth is a 74 year old female presenting with medication recheck.     Social History     Tobacco Use   Smoking Status Current Every Day Smoker   • Packs/day: 0.25   • Years: 40.00   • Pack years: 10.00   • Types: Cigarettes   Smokeless Tobacco Never Used     Medications verified, no changes.    Pt needs refill? Yes    Denies known Latex allergy or symptoms of Latex sensitivity.    Health Maintenance Due   Topic Date Due   • Hepatitis B Vaccine (1 of 3 - Risk 3-dose series) 08/16/1964   • Shingles Vaccine (1 of 2) 08/16/1995   • Diabetes Eye Exam  07/01/2020       Patient is due for the topics as listed above   Patient wore mask: Yes  Team member wore mask : Yes               no

## 2020-08-04 NOTE — ED PROVIDER NOTE - TEMPLATE, MLM
Please call [phone] to schedule your mammogram.     Your PAP smear results will be back in about 1 week.    Please do your bloodwork around the time of your appointment with Dr. Olmedo.   Fluid/Electrolyte/Metabolic

## 2023-06-13 NOTE — ED PROVIDER NOTE - CROS ED CONS ALL NEG
Referral to Derm was placed secondary to lightening of the pigment on the periphery of the nevus.   negative...

## 2023-12-27 NOTE — ED ADULT NURSE NOTE - CARDIO ASSESSMENT
PAST MEDICAL HISTORY:  Chronic anticoagulation     Crohn's disease     DVT (deep venous thrombosis)     History of deep venous thrombosis (DVT) of distal vein of left lower extremity     HTN (hypertension)     Thrombocytopenia     Type 2 diabetes mellitus      WDL

## 2024-08-15 NOTE — ED PROVIDER NOTE - CHIEF COMPLAINT
Annamarie calls back and is agreeable to appointment. Would like to be made aware if anything sooner opens up.   
Isi from [facility] calls to state she went to see Mira today and she is refusing all therapy, does not want to get out of bed for the last 2 days. Isi has seen her many times and knows the family well. She states that she just wants to die. No suicide tendencies. Isi tried to do a depression screening but the pt got very angry. They are going to place her on hourly checks at the facility. The family would like to activate the patients POA. Wondering if you can see her before the weekend. Please advise.    If we are going to make a future andi please call Juanita Roberto)  cell number thank you.  
Left message for patient's daughter, Annamarie, to return phone call.    
Will see her on Friday at 4 pm  
The patient is a 89y Female complaining of nose bleed.